# Patient Record
Sex: FEMALE | Race: WHITE | NOT HISPANIC OR LATINO | Employment: FULL TIME | URBAN - NONMETROPOLITAN AREA
[De-identification: names, ages, dates, MRNs, and addresses within clinical notes are randomized per-mention and may not be internally consistent; named-entity substitution may affect disease eponyms.]

---

## 2017-06-21 ENCOUNTER — TRANSCRIBE ORDERS (OUTPATIENT)
Dept: LAB | Facility: HOSPITAL | Age: 22
End: 2017-06-21

## 2017-06-21 ENCOUNTER — TRANSCRIBE ORDERS (OUTPATIENT)
Dept: GENERAL RADIOLOGY | Facility: CLINIC | Age: 22
End: 2017-06-21

## 2017-06-21 ENCOUNTER — LAB (OUTPATIENT)
Dept: LAB | Facility: HOSPITAL | Age: 22
End: 2017-06-21

## 2017-06-21 DIAGNOSIS — M54.5 LOW BACK PAIN, UNSPECIFIED BACK PAIN LATERALITY, UNSPECIFIED CHRONICITY, WITH SCIATICA PRESENCE UNSPECIFIED: Primary | ICD-10-CM

## 2017-06-21 DIAGNOSIS — M54.5 LOW BACK PAIN, UNSPECIFIED BACK PAIN LATERALITY, UNSPECIFIED CHRONICITY, WITH SCIATICA PRESENCE UNSPECIFIED: ICD-10-CM

## 2017-06-21 DIAGNOSIS — M54.50 SPINE PAIN, LUMBAR: Primary | ICD-10-CM

## 2017-06-21 LAB — B-HCG UR QL: NEGATIVE

## 2017-06-21 PROCEDURE — 81025 URINE PREGNANCY TEST: CPT

## 2017-06-28 ENCOUNTER — TRANSCRIBE ORDERS (OUTPATIENT)
Dept: PHYSICAL THERAPY | Facility: HOSPITAL | Age: 22
End: 2017-06-28

## 2017-06-28 DIAGNOSIS — S39.012A LUMBAR STRAIN, INITIAL ENCOUNTER: Primary | ICD-10-CM

## 2017-06-30 ENCOUNTER — HOSPITAL ENCOUNTER (OUTPATIENT)
Dept: PHYSICAL THERAPY | Facility: HOSPITAL | Age: 22
Setting detail: THERAPIES SERIES
End: 2017-06-30

## 2018-10-04 ENCOUNTER — PROCEDURE VISIT (OUTPATIENT)
Dept: OBSTETRICS AND GYNECOLOGY | Facility: CLINIC | Age: 23
End: 2018-10-04

## 2018-10-04 VITALS
HEIGHT: 67 IN | WEIGHT: 288 LBS | SYSTOLIC BLOOD PRESSURE: 128 MMHG | BODY MASS INDEX: 45.2 KG/M2 | DIASTOLIC BLOOD PRESSURE: 75 MMHG | HEART RATE: 79 BPM

## 2018-10-04 DIAGNOSIS — Z78.9 RELIES ON PARTNER'S VASECTOMY FOR PRIMARY METHOD OF CONTRACEPTION: ICD-10-CM

## 2018-10-04 DIAGNOSIS — Z30.46 NEXPLANON REMOVAL: Primary | ICD-10-CM

## 2018-10-04 PROCEDURE — 11982 REMOVE DRUG IMPLANT DEVICE: CPT | Performed by: NURSE PRACTITIONER

## 2018-10-04 NOTE — PROGRESS NOTES
Nexplanon Removal    Date of procedure:  10/4/2018    Risks and benefits discussed? yes  All questions answered? yes  Consents given by the patient  Written consent obtained? yes    Local anesthesia used:  yes - 3 cc's of  Meds; anesthesia local: 1% lidocaine with epinephrine    Procedure documentation:    The upper left arm (non-dominant) was marked at the intended site of removal.  The skin was cleansed with an antispetic solution.  Local anesthesia was injected.  A vertical horizontal was created at the distal tip of the implant.  The implant was removed intact without difficulty.  A 4x4 sterile gauze was placed over the incision site and the arm was wrapped with gauze.  She tolerated the procedure well.  There were no complications.  EBL was minimal.    Post procedure instructions: Remove the wrapping in 24 hours and cover with a  band-aid if still open.    Follow up needed: RHONDA Webber was seen today for procedure.    Diagnoses and all orders for this visit:    Nexplanon removal    Relies on partner's vasectomy for primary method of contraception        This note was electronically signed.    WESLEY Hernandez  October 4, 2018

## 2019-02-20 ENCOUNTER — HOSPITAL ENCOUNTER (OUTPATIENT)
Dept: ULTRASOUND IMAGING | Facility: HOSPITAL | Age: 24
Discharge: HOME | End: 2019-02-20
Admitting: NURSE PRACTITIONER

## 2019-02-20 DIAGNOSIS — R74.01 ELEVATED ALT MEASUREMENT: ICD-10-CM

## 2019-02-20 PROCEDURE — 76705 ECHO EXAM OF ABDOMEN: CPT

## 2019-04-02 DIAGNOSIS — R55 SYNCOPE AND COLLAPSE: Primary | ICD-10-CM

## 2019-04-03 ENCOUNTER — OFFICE VISIT (OUTPATIENT)
Dept: CARDIOLOGY | Facility: CLINIC | Age: 24
End: 2019-04-03

## 2019-04-03 VITALS
HEART RATE: 83 BPM | SYSTOLIC BLOOD PRESSURE: 128 MMHG | BODY MASS INDEX: 45.99 KG/M2 | WEIGHT: 293 LBS | HEIGHT: 67 IN | DIASTOLIC BLOOD PRESSURE: 70 MMHG

## 2019-04-03 DIAGNOSIS — R55 SYNCOPE AND COLLAPSE: Primary | ICD-10-CM

## 2019-04-03 PROCEDURE — 99204 OFFICE O/P NEW MOD 45 MIN: CPT | Performed by: INTERNAL MEDICINE

## 2019-04-03 PROCEDURE — 93000 ELECTROCARDIOGRAM COMPLETE: CPT | Performed by: INTERNAL MEDICINE

## 2019-04-03 RX ORDER — BUPROPION HYDROCHLORIDE 150 MG/1
150 TABLET ORAL DAILY
Refills: 0 | COMMUNITY
Start: 2019-03-28 | End: 2022-08-18

## 2019-04-03 NOTE — PROGRESS NOTES
Lawanda Lara  23 y.o. female    04/03/2019  1. Syncope and collapse        History of Present Illness:    Patient's Body mass index is 46.05 kg/m². BMI is above normal parameters. Recommendations include: exercise counseling, nutrition counseling and referral to primary care.    23 years old patient with long-standing history of syncope triggered by emotion, and needlestick, single blood evaluated by electrophysiologist on November and started on midodrine without significant improvement with abnormal head up tilt test several years ago continue having off-and-on passing out spell due to trigger mentioned above.  No chest pain orthopnea PND reported.  No sustained palpitation reported.  No intermittent claudication reported.  No chest pain reported.  Her grandmother was present in the room at the time of evaluations.  EKG sinus rhythm with a normal interval and evidence of preexcitation    SUBJECTIVE:    Allergies   Allergen Reactions   • Amoxicillin    • Penicillins Rash         Past Medical History:   Diagnosis Date   • ADHD (attention deficit hyperactivity disorder)    • Allergic    • Migraines    • Vasovagal syncope          Past Surgical History:   Procedure Laterality Date   • COLONOSCOPY           Family History   Problem Relation Age of Onset   • Depression Mother    • Diabetes Maternal Grandmother    • Hypertension Maternal Grandfather          Social History     Socioeconomic History   • Marital status: Single     Spouse name: Not on file   • Number of children: Not on file   • Years of education: Not on file   • Highest education level: Not on file   Tobacco Use   • Smoking status: Never Smoker   • Smokeless tobacco: Never Used   Substance and Sexual Activity   • Alcohol use: Yes     Comment: occasional   • Drug use: No         Current Outpatient Medications   Medication Sig Dispense Refill   • buPROPion XL (WELLBUTRIN XL) 150 MG 24 hr tablet Take 150 mg by mouth Daily.  0     No current  "facility-administered medications for this visit.            Review of Systems:     Constitutional:  Denies recent weight loss, weight gain, fever or chills, no change in exercise tolerance.     HENT:  Denies any hearing loss, epistaxis, hoarseness, or difficulty speaking.     Eyes: No blurry    Respiratory:  Denies dyspnea with exertion,no cough, wheezing, or hemoptysis.     Cardiovascular: See H&P    Gastrointestinal:  Denies change in bowel habits, dyspepsia, ulcer disease, hematochezia, or melena.     Endocrine: Negative for cold intolerance, heat intolerance, polydipsia, polyphagia and polyuria. Denies any history of weight change, polydipsia, polyuria.     Genitourinary: Negative.      Musculoskeletal: Denies any history of arthritic symptoms or back problems.     Skin:  Denies any change in hair or nails, rashes, or skin lesions.     Allergic/Immunologic: Negative.  Negative for environmental allergies, food allergies and immunocompromised state.     Neurological:  Denies any history of recurrent headaches, strokes, TIA, or seizure disorder. Positive for syncope    Hematological: Denies any food allergies, seasonal allergies, bleeding disorders, or lymphadenopathy.     Psychiatric/Behavioral: Denies any history of depression, substance abuse, or change in cognitive function.       OBJECTIVE:    /70   Pulse 83   Ht 170.2 cm (67\")   Wt 133 kg (294 lb)   BMI 46.05 kg/m²       Physical Exam:     Constitutional: Cooperative, alert and oriented, well-developed, well-nourished, in no acute distress.     HENT:   Head: Normocephalic, normal hair patterns, no masses or tenderness.  Ears, Nose, and Throat: No gross abnormalities. No pallor or cyanosis. Dentition good.   Eyes: EOMS intact, PERRL, conjunctivae and lids unremarkable. Fundoscopic exam and visual fields not performed.   Neck: No palpable masses or adenopathy, no thyromegaly, no JVD, carotid pulses are full and equal bilaterally and without  Bruits. "     Cardiovascular: Regular rhythm, S1 and S2 normal, no S3 or S4. Apical impulse not displaced. No murmurs, gallops, or rubs detected.     Pulmonary/Chest: Chest: normal symmetry, no tenderness to palpation, normal respiratory excursion, no intercostal retraction, no use of accessory muscles. Pulmonary: Normal breath sounds. No rales or rhonchi.    Abdominal: Abdomen soft, bowel sounds normoactive, no masses, no hepatosplenomegaly, non-tender, no bruits.     Musculoskeletal: No deformities, clubbing, cyanosis, erythema, or edema observed. There are no spinal abnormalities noted. Normal muscle strength and tone. Pulses full and equal in all extremities, no bruits auscultated.     Neurological: No gross motor or sensory deficits noted, affect appropriate, oriented to time, person, place.     Skin: Warm and dry to the touch, no apparent skin lesions or masses noted.     Psychiatric: She has a normal mood and affect. Her behavior is normal. Judgment and thought content normal.         Procedures      Lab Results   Component Value Date    WBC 7.8 08/25/2014    HGB 13.2 08/25/2014    HCT 37.9 08/25/2014    MCV 82.0 08/25/2014     08/25/2014     Lab Results   Component Value Date    GLUCOSE 106 (H) 08/25/2014    BUN 9 08/25/2014    CREATININE 0.7 08/25/2014    CO2 25 08/25/2014    CALCIUM 9.3 08/25/2014    ALBUMIN 4.4 08/25/2014    AST 31 08/25/2014    ALT 35 08/25/2014     No results found for: CHOL  No results found for: TRIG  No results found for: HDL  No components found for: LDLCALC  No results found for: LDL  No results found for: HDLLDLRATIO  No components found for: CHOLHDL  No results found for: HGBA1C  No results found for: TSH, N0BFDRN, U6OVUQD, THYROIDAB        ASSESSMENT AND PLAN:  #1 syncope #2 exogenous obesity    Clinically, no sign of cardiac decompensation based on clinical history physical findings.  Risk factor lifestyle modification discussed with the patient.  Significant of low carbohydrate,  low-fat explained to the patient's.  The patient have documented history of syncope and no known trigger and evaluated by elective neurologist at [facility] with the prescriptions of midodrine without significant benefit.  She was told to increase salt and water intake.  I spent more than 40-minute regarding behavioral modification and demonstrated the patient to stand against the wall and increase her time up to 40-minute.  Important salt and water intake discussed with the patient.  I would repeat head up tilt test and based on that we will consider either droxidopa or combination of droxidopa and midodrine.  We will see her back in a month    Lawanda was seen today for new patient.    Diagnoses and all orders for this visit:    Syncope and collapse  -     Tilt Table; Future  -     Adult Transthoracic Echo Complete W/ Cont if Necessary Per Protocol; Future        Amaury Vital MD  4/3/2019  9:07 AM

## 2019-04-17 ENCOUNTER — HOSPITAL ENCOUNTER (OUTPATIENT)
Dept: CARDIOLOGY | Facility: HOSPITAL | Age: 24
Discharge: HOME | End: 2019-04-17
Admitting: INTERNAL MEDICINE

## 2019-04-17 DIAGNOSIS — R55 SYNCOPE AND COLLAPSE: ICD-10-CM

## 2019-04-17 PROCEDURE — 93660 TILT TABLE EVALUATION: CPT

## 2019-04-17 PROCEDURE — 93660 TILT TABLE EVALUATION: CPT | Performed by: INTERNAL MEDICINE

## 2019-04-19 LAB
MAXIMAL PREDICTED HEART RATE: 197 BPM
STRESS TARGET HR: 167 BPM

## 2019-04-24 ENCOUNTER — OFFICE VISIT (OUTPATIENT)
Dept: CARDIOLOGY | Facility: CLINIC | Age: 24
End: 2019-04-24

## 2019-04-24 VITALS
HEART RATE: 96 BPM | WEIGHT: 289 LBS | DIASTOLIC BLOOD PRESSURE: 74 MMHG | OXYGEN SATURATION: 99 % | HEIGHT: 67 IN | BODY MASS INDEX: 45.36 KG/M2 | SYSTOLIC BLOOD PRESSURE: 126 MMHG

## 2019-04-24 DIAGNOSIS — R55 SYNCOPE AND COLLAPSE: Primary | ICD-10-CM

## 2019-04-24 PROCEDURE — 99213 OFFICE O/P EST LOW 20 MIN: CPT | Performed by: INTERNAL MEDICINE

## 2019-04-24 RX ORDER — MIDODRINE HYDROCHLORIDE 5 MG/1
5 TABLET ORAL 3 TIMES DAILY
Qty: 90 TABLET | Refills: 5 | Status: SHIPPED | OUTPATIENT
Start: 2019-04-24 | End: 2020-03-11 | Stop reason: HOSPADM

## 2019-04-24 NOTE — PROGRESS NOTES
Lawanda Lara  23 y.o. female    04/24/2019  1. Syncope and collapse        History of Present Illness:    Patient's Body mass index is 45.26 kg/m². BMI is above normal parameters. Recommendations include: exercise counseling, nutrition counseling and referral to primary care   .  23 years old patient with long-standing history of syncope triggered by emotion, and needlestick,  evaluated by electrophysiologist at [place] and started on midodrine without significant improvement with abnormal head up tilt test several years ago continue having off-and-on passing out spell due to trigger mentioned above.  No chest pain orthopnea PND reported.  No sustained palpitation reported.  No intermittent claudication reported.  No chest pain reported.  Her grandmother was present in the room at the time of evaluations.  EKG sinus rhythm with a normal interval and evidence of preexcitation patient had had up tilt test with a vasodepressive component and echo no significant valvular abnormality within normal heart function.  Results of head up tilt test and echocardiogram study discussed with the patient the family.  Patient was educated regarding risk factor lifestyle modification and increasing salt and water uptake        Procedure date 4/17/2019     Procedure performed:     Head up tilt test     Description:     Head up tilt test performed as per protocol and throughout the procedures patient heart rate blood pressure and saturation monitored.  The baseline heart rate 98 and with a blood pressure 120/86 at minute 24 ; 37-second the patient  drop in systolic blood pressure with the symptom of near syncope and table brought to the horizontal with recovery and without sequela with heart rate increased to 110 bpm.  The head up tilt test is positive with a vasodepressive mechanism of syncope     Clinical impression  Head up tilt test was positive for vasodepressive mechanism of syncope    ECHO 4/17/19  · Mild tricuspid  valve regurgitation is present.  · Estimated EF = 60%.  · Left ventricular systolic function is normal.  The mitral valve is normal in structure. Trace-to-mild mitral valve regurgitation is present.   Tricuspid Valve The tricuspid valve is normal. Mild tricuspid valve regurgitation is present.       SUBJECTIVE:    Allergies   Allergen Reactions   • Amoxicillin    • Penicillins Rash         Past Medical History:   Diagnosis Date   • ADHD (attention deficit hyperactivity disorder)    • Allergic    • Migraines    • Vasovagal syncope          Past Surgical History:   Procedure Laterality Date   • COLONOSCOPY           Family History   Problem Relation Age of Onset   • Depression Mother    • Diabetes Maternal Grandmother    • Hypertension Maternal Grandfather          Social History     Socioeconomic History   • Marital status: Single     Spouse name: Not on file   • Number of children: Not on file   • Years of education: Not on file   • Highest education level: Not on file   Tobacco Use   • Smoking status: Never Smoker   • Smokeless tobacco: Never Used   Substance and Sexual Activity   • Alcohol use: Yes     Comment: occasional   • Drug use: No         Current Outpatient Medications   Medication Sig Dispense Refill   • buPROPion XL (WELLBUTRIN XL) 150 MG 24 hr tablet Take 150 mg by mouth Daily.  0     No current facility-administered medications for this visit.            Review of Systems:     Constitutional:  Denies recent weight loss, weight gain, fever or chills, no change in exercise tolerance.     HENT:  Denies any hearing loss, epistaxis, hoarseness, or difficulty speaking.     Eyes: No blurry    Respiratory:  Denies dyspnea with exertion,no cough, wheezing, or hemoptysis.     Cardiovascular: Negative for palpations, chest pain, orthopnea, PND, peripheral edema, syncope, or claudication.     Gastrointestinal:  Denies change in bowel habits, dyspepsia, ulcer disease, hematochezia, or melena.     Endocrine:  "Negative for cold intolerance, heat intolerance, polydipsia, polyphagia and polyuria. Denies any history of weight change, polydipsia, polyuria.     Genitourinary: Negative.      Musculoskeletal: Denies any history of arthritic symptoms or back problems.     Skin:  Denies any change in hair or nails, rashes, or skin lesions.     Allergic/Immunologic: Negative.  Negative for environmental allergies, food allergies and immunocompromised state.     Neurological:  Denies any history of recurrent headaches, strokes, TIA, or seizure disorder.     Hematological: Denies any food allergies, seasonal allergies, bleeding disorders, or lymphadenopathy.     Psychiatric/Behavioral: Denies any history of depression, substance abuse, or change in cognitive function.       OBJECTIVE:    /74   Pulse 96   Ht 170.2 cm (67\")   Wt 131 kg (289 lb)   SpO2 99%   BMI 45.26 kg/m²       Physical Exam:     Constitutional: Cooperative, alert and oriented, well-developed, well-nourished, in no acute distress.     HENT:   Head: Normocephalic, normal hair patterns, no masses or tenderness.  Ears, Nose, and Throat: No gross abnormalities. No pallor or cyanosis. Dentition good.   Eyes: EOMS intact, PERRL, conjunctivae and lids unremarkable. Fundoscopic exam and visual fields not performed.   Neck: No palpable masses or adenopathy, no thyromegaly, no JVD, carotid pulses are full and equal bilaterally and without  Bruits.     Cardiovascular: Regular rhythm, S1 and S2 normal, no S3 or S4. Apical impulse not displaced. No murmurs, gallops, or rubs detected.     Pulmonary/Chest: Chest: normal symmetry, no tenderness to palpation, normal respiratory excursion, no intercostal retraction, no use of accessory muscles. Pulmonary: Normal breath sounds. No rales or rhonchi.    Abdominal: Abdomen soft, bowel sounds normoactive, no masses, no hepatosplenomegaly, non-tender, no bruits.     Musculoskeletal: No deformities, clubbing, cyanosis, erythema, or " edema observed. There are no spinal abnormalities noted. Normal muscle strength and tone. Pulses full and equal in all extremities, no bruits auscultated.     Neurological: No gross motor or sensory deficits noted, affect appropriate, oriented to time, person, place.     Skin: Warm and dry to the touch, no apparent skin lesions or masses noted.     Psychiatric: She has a normal mood and affect. Her behavior is normal. Judgment and thought content normal.         Procedures      Lab Results   Component Value Date    WBC 7.8 08/25/2014    HGB 13.2 08/25/2014    HCT 37.9 08/25/2014    MCV 82.0 08/25/2014     08/25/2014     Lab Results   Component Value Date    GLUCOSE 106 (H) 08/25/2014    BUN 9 08/25/2014    CREATININE 0.7 08/25/2014    CO2 25 08/25/2014    CALCIUM 9.3 08/25/2014    ALBUMIN 4.4 08/25/2014    AST 31 08/25/2014    ALT 35 08/25/2014     No results found for: CHOL  No results found for: TRIG  No results found for: HDL  No components found for: LDLCALC  No results found for: LDL  No results found for: HDLLDLRATIO  No components found for: CHOLHDL  No results found for: HGBA1C  No results found for: TSH, [id number], A1FUQLK, THYROIDAB        ASSESSMENT AND PLAN:  #1 syncope #2 exogenous obesity     Clinically, no sign of cardiac decompensation based on clinical history physical findings.  Risk factor lifestyle modification discussed with the patient.  Significant of low carbohydrate, low-fat explained to the patient's.  The patient have documented history of syncope and no known trigger and evaluated by elective neurologist at [facility] with the prescriptions of midodrine without significant benefit.  She was told to increase salt and water intake.  I spent more than 40-minute regarding behavioral modification and demonstrated the patient to stand against the wall and increase her time up to 40-minute.  Important salt and water intake discussed with the patient.  The patient has vasodepressive component  of vasovagal syncope and will start on midodrine starting dose of 5 mg p.o. 3 times daily dose can be increased and if he remains symptomatic droxidopa can be contemplated.      Lawanda was seen today for follow-up.    Diagnoses and all orders for this visit:    Syncope and collapse        Amaury Vital MD  4/24/2019  4:16 PM

## 2019-11-05 ENCOUNTER — TRANSCRIBE ORDERS (OUTPATIENT)
Dept: CT IMAGING | Facility: HOSPITAL | Age: 24
End: 2019-11-05

## 2019-11-05 DIAGNOSIS — R10.9 ABDOMINAL DISCOMFORT: Primary | ICD-10-CM

## 2020-03-11 ENCOUNTER — PROCEDURE VISIT (OUTPATIENT)
Dept: OBSTETRICS AND GYNECOLOGY | Facility: CLINIC | Age: 25
End: 2020-03-11

## 2020-03-11 VITALS
SYSTOLIC BLOOD PRESSURE: 118 MMHG | DIASTOLIC BLOOD PRESSURE: 64 MMHG | BODY MASS INDEX: 45.99 KG/M2 | HEIGHT: 67 IN | WEIGHT: 293 LBS

## 2020-03-11 DIAGNOSIS — N91.1 SECONDARY AMENORRHEA: ICD-10-CM

## 2020-03-11 DIAGNOSIS — Z12.4 ENCOUNTER FOR PAPANICOLAOU SMEAR OF CERVIX: Primary | ICD-10-CM

## 2020-03-11 DIAGNOSIS — Z11.3 SCREEN FOR STD (SEXUALLY TRANSMITTED DISEASE): ICD-10-CM

## 2020-03-11 DIAGNOSIS — Z01.419 ENCOUNTER FOR ANNUAL ROUTINE GYNECOLOGICAL EXAMINATION: ICD-10-CM

## 2020-03-11 DIAGNOSIS — R10.2 PELVIC PAIN: ICD-10-CM

## 2020-03-11 PROCEDURE — G0123 SCREEN CERV/VAG THIN LAYER: HCPCS | Performed by: NURSE PRACTITIONER

## 2020-03-11 PROCEDURE — 99395 PREV VISIT EST AGE 18-39: CPT | Performed by: NURSE PRACTITIONER

## 2020-03-11 PROCEDURE — 87661 TRICHOMONAS VAGINALIS AMPLIF: CPT | Performed by: NURSE PRACTITIONER

## 2020-03-11 PROCEDURE — 87491 CHLMYD TRACH DNA AMP PROBE: CPT | Performed by: NURSE PRACTITIONER

## 2020-03-11 PROCEDURE — 88141 CYTOPATH C/V INTERPRET: CPT | Performed by: PATHOLOGY

## 2020-03-11 PROCEDURE — 87591 N.GONORRHOEAE DNA AMP PROB: CPT | Performed by: NURSE PRACTITIONER

## 2020-03-11 RX ORDER — ERGOCALCIFEROL (VITAMIN D2) 50 MCG
CAPSULE ORAL
COMMUNITY
Start: 2020-02-26 | End: 2022-08-18

## 2020-03-11 NOTE — PROGRESS NOTES
Subjective   Lawanda Perez is a 24 y.o. here for gyn annual    LMP: unknown  BC: Vasectomy  PAP: 3 years ago, normal per patient      Pt c/o of not having period since October 2019. She had the Nexplanon for 3 years and had it removed in October 2018. Prior to the Nexplanon, she was on Depo-Provera for years and did not have a period with it. She reports that she has had two periods since her Nexplanon was removed. She also c/o of left sided pelvic pain X 1-2 months.     Gynecologic Exam   The patient's primary symptoms include missed menses and pelvic pain. The patient's pertinent negatives include no genital itching, genital lesions, genital odor, genital rash, vaginal bleeding or vaginal discharge. This is a chronic problem. The current episode started more than 1 month ago. The problem has been unchanged. The pain is mild. The problem affects the left side. Pertinent negatives include no abdominal pain, chills, constipation, diarrhea, dysuria, fever, frequency, nausea, rash or sore throat. Nothing aggravates the symptoms. She has tried NSAIDs for the symptoms. The treatment provided no relief. No, her partner does not have an STD. She uses vasectomy for contraception. Her menstrual history has been irregular.       The following portions of the patient's history were reviewed and updated as appropriate: allergies, current medications, past family history, past medical history, past social history, past surgical history and problem list.    Review of Systems   Constitutional: Negative for chills, fatigue, fever, unexpected weight gain and unexpected weight loss.   HENT: Negative for sneezing and sore throat.    Respiratory: Negative for shortness of breath.    Cardiovascular: Negative for chest pain and palpitations.   Gastrointestinal: Negative for abdominal pain, constipation, diarrhea and nausea.   Endocrine: Negative for cold intolerance and heat intolerance.   Genitourinary: Positive for amenorrhea,  menstrual problem, missed menses and pelvic pain. Negative for breast discharge, breast lump, breast pain, difficulty urinating, dysuria, frequency, pelvic pressure, urinary incontinence, vaginal bleeding, vaginal discharge and vaginal pain.   Skin: Negative for rash.   Neurological: Negative for weakness and headache.   Psychiatric/Behavioral: Negative for sleep disturbance, depressed mood and stress.       Objective   Physical Exam   Constitutional: She is oriented to person, place, and time. She appears well-developed and well-nourished.   Obese female   HENT:   Head: Normocephalic.   Neck: Normal range of motion. Neck supple.   Cardiovascular: Normal rate and regular rhythm.   Pulmonary/Chest: Effort normal and breath sounds normal.   Abdominal: Soft.   Genitourinary: There is no rash, tenderness, lesion or injury on the right labia. There is no rash, tenderness, lesion or injury on the left labia. Cervix exhibits friability. Cervix exhibits no discharge. No erythema, tenderness or bleeding in the vagina. No foreign body in the vagina. No signs of injury around the vagina. No vaginal discharge found.   Genitourinary Comments: Pap & GC swab obtained. Unable to perform bimanual as pt did not tolerate Pap test.    Musculoskeletal: Normal range of motion.   Neurological: She is alert and oriented to person, place, and time.   Skin: Skin is warm and dry.   Psychiatric: She has a normal mood and affect. Her behavior is normal.   Nursing note and vitals reviewed.        Assessment/Plan   Diagnoses and all orders for this visit:    Encounter for Papanicolaou smear of cervix  -     Liquid-based Pap Smear, Screening    Pelvic pain  -     US Non-ob Transvaginal; Future    Secondary amenorrhea  -     DHEA-Sulfate; Future  -     Follicle Stimulating Hormone; Future  -     hCG, Serum, Qualitative; Future  -     Hemoglobin A1c; Future  -     Luteinizing Hormone; Future  -     Prolactin; Future  -     Sex Horm Binding Globulin;  Future  -     Testosterone (Free & Total), LC / MS; Future  -     TSH; Future  -     US Non-ob Transvaginal; Future    Encounter for annual routine gynecological examination    Screen for STD (sexually transmitted disease)  -     Chlamydia trachomatis, Neisseria gonorrhoeae, Trichomonas vaginalis, PCR - Swab, Cervix          Discussed possible etiology of secondary amenorrhea to include PCOS. Pt to complete labs and TVUS and follow-up in 2-3 weeks.

## 2020-03-12 LAB
C TRACH RRNA CVX QL NAA+PROBE: NEGATIVE
N GONORRHOEA RRNA SPEC QL NAA+PROBE: NEGATIVE
TRICHOMONAS VAGINALIS PCR: NEGATIVE

## 2020-03-13 LAB
GEN CATEG CVX/VAG CYTO-IMP: NORMAL
LAB AP CASE REPORT: NORMAL
LAB AP GYN ADDITIONAL INFORMATION: NORMAL
LAB AP GYN OTHER FINDINGS: NORMAL
PATH INTERP SPEC-IMP: NORMAL
STAT OF ADQ CVX/VAG CYTO-IMP: NORMAL

## 2020-03-16 ENCOUNTER — LAB (OUTPATIENT)
Dept: LAB | Facility: HOSPITAL | Age: 25
End: 2020-03-16

## 2020-03-16 DIAGNOSIS — N91.1 SECONDARY AMENORRHEA: ICD-10-CM

## 2020-03-16 LAB
FSH SERPL-ACNC: 5.5 MIU/ML
HBA1C MFR BLD: 5.88 % (ref 4.8–5.6)
HCG SERPL QL: NEGATIVE
LH SERPL-ACNC: 7.82 MIU/ML
PROLACTIN SERPL-MCNC: 17.6 NG/ML (ref 4.79–23.3)
TSH SERPL DL<=0.05 MIU/L-ACNC: 2.87 UIU/ML (ref 0.27–4.2)

## 2020-03-16 PROCEDURE — 83001 ASSAY OF GONADOTROPIN (FSH): CPT

## 2020-03-16 PROCEDURE — 84703 CHORIONIC GONADOTROPIN ASSAY: CPT

## 2020-03-16 PROCEDURE — 84402 ASSAY OF FREE TESTOSTERONE: CPT

## 2020-03-16 PROCEDURE — 82627 DEHYDROEPIANDROSTERONE: CPT

## 2020-03-16 PROCEDURE — 84270 ASSAY OF SEX HORMONE GLOBUL: CPT

## 2020-03-16 PROCEDURE — 84146 ASSAY OF PROLACTIN: CPT

## 2020-03-16 PROCEDURE — 83036 HEMOGLOBIN GLYCOSYLATED A1C: CPT

## 2020-03-16 PROCEDURE — 83002 ASSAY OF GONADOTROPIN (LH): CPT

## 2020-03-16 PROCEDURE — 84403 ASSAY OF TOTAL TESTOSTERONE: CPT

## 2020-03-16 PROCEDURE — 84443 ASSAY THYROID STIM HORMONE: CPT

## 2020-03-17 LAB
DHEA-S SERPL-MCNC: 203.6 UG/DL (ref 110–431.7)
SHBG SERPL-SCNC: 16.2 NMOL/L (ref 24.6–122)

## 2020-03-18 LAB
TESTOST FREE SERPL-MCNC: 5.2 PG/ML (ref 0–4.2)
TESTOST SERPL-MCNC: 39.1 NG/DL (ref 10–55)

## 2020-03-19 ENCOUNTER — TELEPHONE (OUTPATIENT)
Dept: OBSTETRICS AND GYNECOLOGY | Facility: CLINIC | Age: 25
End: 2020-03-19

## 2020-03-19 RX ORDER — NORETHINDRONE ACETATE AND ETHINYL ESTRADIOL 1MG-20(21)
1 KIT ORAL DAILY
Qty: 28 TABLET | Refills: 12 | Status: SHIPPED | OUTPATIENT
Start: 2020-03-19 | End: 2021-03-19

## 2020-03-19 NOTE — TELEPHONE ENCOUNTER
Reviewed labs and TVUS- pt has PCOS based on irregular periods and elevated testosterone and low SHBG. Pt also has insulin resistance. Counseled on weight loss as a first line for management and pt agreeable to trying birth control to regulate menstrual cycles. Pt to follow-up in 3 months.

## 2022-08-18 PROBLEM — E66.01 MORBID OBESITY: Status: ACTIVE | Noted: 2021-05-25

## 2022-08-18 PROBLEM — E28.2 PCOS (POLYCYSTIC OVARIAN SYNDROME): Status: ACTIVE | Noted: 2021-05-25

## 2022-08-18 PROBLEM — E55.9 VITAMIN D DEFICIENCY: Status: ACTIVE | Noted: 2021-05-25

## 2022-08-18 PROBLEM — F41.8 MIXED ANXIETY DEPRESSIVE DISORDER: Status: ACTIVE | Noted: 2021-05-25

## 2022-08-18 PROBLEM — R35.0 FREQUENT URINATION: Status: ACTIVE | Noted: 2021-05-25

## 2022-08-18 PROBLEM — F33.1 MODERATE RECURRENT MAJOR DEPRESSION: Status: ACTIVE | Noted: 2021-05-25

## 2022-08-18 PROBLEM — N32.89 BLADDER SPASMS: Status: ACTIVE | Noted: 2021-05-25

## 2023-08-03 ENCOUNTER — OFFICE VISIT (OUTPATIENT)
Dept: OBSTETRICS AND GYNECOLOGY | Facility: CLINIC | Age: 28
End: 2023-08-03
Payer: COMMERCIAL

## 2023-08-03 VITALS
HEIGHT: 65 IN | SYSTOLIC BLOOD PRESSURE: 140 MMHG | BODY MASS INDEX: 48.82 KG/M2 | WEIGHT: 293 LBS | DIASTOLIC BLOOD PRESSURE: 86 MMHG

## 2023-08-03 DIAGNOSIS — Z01.419 WOMEN'S ANNUAL ROUTINE GYNECOLOGICAL EXAMINATION: Primary | ICD-10-CM

## 2023-08-03 DIAGNOSIS — Z12.4 ENCOUNTER FOR PAPANICOLAOU SMEAR FOR CERVICAL CANCER SCREENING: ICD-10-CM

## 2023-08-03 DIAGNOSIS — E28.2 PCOS (POLYCYSTIC OVARIAN SYNDROME): ICD-10-CM

## 2023-08-03 DIAGNOSIS — Z78.9 RELIES ON PARTNER'S VASECTOMY FOR PRIMARY METHOD OF CONTRACEPTION: ICD-10-CM

## 2023-08-03 DIAGNOSIS — R10.2 PELVIC PAIN: ICD-10-CM

## 2023-08-08 LAB — REF LAB TEST METHOD: NORMAL

## 2023-08-25 ENCOUNTER — HOSPITAL ENCOUNTER (EMERGENCY)
Facility: HOSPITAL | Age: 28
Discharge: HOME | End: 2023-08-26
Attending: EMERGENCY MEDICINE
Payer: COMMERCIAL

## 2023-08-25 ENCOUNTER — APPOINTMENT (OUTPATIENT)
Dept: ULTRASOUND IMAGING | Facility: HOSPITAL | Age: 28
End: 2023-08-25
Payer: COMMERCIAL

## 2023-08-25 DIAGNOSIS — N73.0 PID (ACUTE PELVIC INFLAMMATORY DISEASE): Primary | ICD-10-CM

## 2023-08-25 LAB
ALBUMIN SERPL-MCNC: 4.3 G/DL (ref 3.5–5.2)
ALBUMIN/GLOB SERPL: 1.1 G/DL
ALP SERPL-CCNC: 54 U/L (ref 39–117)
ALT SERPL W P-5'-P-CCNC: 114 U/L (ref 1–33)
ANION GAP SERPL CALCULATED.3IONS-SCNC: 14 MMOL/L (ref 5–15)
AST SERPL-CCNC: 69 U/L (ref 1–32)
BACTERIA UR QL AUTO: ABNORMAL /HPF
BASOPHILS # BLD AUTO: 0.09 10*3/MM3 (ref 0–0.2)
BASOPHILS NFR BLD AUTO: 0.5 % (ref 0–1.5)
BILIRUB SERPL-MCNC: 0.4 MG/DL (ref 0–1.2)
BILIRUB UR QL STRIP: NEGATIVE
BUN SERPL-MCNC: 8 MG/DL (ref 6–20)
BUN/CREAT SERPL: 13.6 (ref 7–25)
CALCIUM SPEC-SCNC: 9.5 MG/DL (ref 8.6–10.5)
CHLORIDE SERPL-SCNC: 102 MMOL/L (ref 98–107)
CLARITY UR: ABNORMAL
CO2 SERPL-SCNC: 20 MMOL/L (ref 22–29)
COLOR UR: YELLOW
CREAT SERPL-MCNC: 0.59 MG/DL (ref 0.57–1)
DEPRECATED RDW RBC AUTO: 37.1 FL (ref 37–54)
EGFRCR SERPLBLD CKD-EPI 2021: 126.1 ML/MIN/1.73
EOSINOPHIL # BLD AUTO: 0.11 10*3/MM3 (ref 0–0.4)
EOSINOPHIL NFR BLD AUTO: 0.6 % (ref 0.3–6.2)
ERYTHROCYTE [DISTWIDTH] IN BLOOD BY AUTOMATED COUNT: 12.3 % (ref 12.3–15.4)
GLOBULIN UR ELPH-MCNC: 4 GM/DL
GLUCOSE SERPL-MCNC: 97 MG/DL (ref 65–99)
GLUCOSE UR STRIP-MCNC: NEGATIVE MG/DL
HCG SERPL QL: NEGATIVE
HCT VFR BLD AUTO: 41.3 % (ref 34–46.6)
HGB BLD-MCNC: 14.2 G/DL (ref 12–15.9)
HGB UR QL STRIP.AUTO: NEGATIVE
HOLD SPECIMEN: NORMAL
HOLD SPECIMEN: NORMAL
HYALINE CASTS UR QL AUTO: ABNORMAL /LPF
IMM GRANULOCYTES # BLD AUTO: 0.1 10*3/MM3 (ref 0–0.05)
IMM GRANULOCYTES NFR BLD AUTO: 0.5 % (ref 0–0.5)
KETONES UR QL STRIP: NEGATIVE
LEUKOCYTE ESTERASE UR QL STRIP.AUTO: ABNORMAL
LIPASE SERPL-CCNC: 21 U/L (ref 13–60)
LYMPHOCYTES # BLD AUTO: 3.81 10*3/MM3 (ref 0.7–3.1)
LYMPHOCYTES NFR BLD AUTO: 20.1 % (ref 19.6–45.3)
MCH RBC QN AUTO: 28.3 PG (ref 26.6–33)
MCHC RBC AUTO-ENTMCNC: 34.4 G/DL (ref 31.5–35.7)
MCV RBC AUTO: 82.4 FL (ref 79–97)
MONOCYTES # BLD AUTO: 1.21 10*3/MM3 (ref 0.1–0.9)
MONOCYTES NFR BLD AUTO: 6.4 % (ref 5–12)
NEUTROPHILS NFR BLD AUTO: 13.6 10*3/MM3 (ref 1.7–7)
NEUTROPHILS NFR BLD AUTO: 71.9 % (ref 42.7–76)
NITRITE UR QL STRIP: NEGATIVE
NRBC BLD AUTO-RTO: 0 /100 WBC (ref 0–0.2)
PH UR STRIP.AUTO: 7 [PH] (ref 5–9)
PLATELET # BLD AUTO: 283 10*3/MM3 (ref 140–450)
PMV BLD AUTO: 9.9 FL (ref 6–12)
POTASSIUM SERPL-SCNC: 3.5 MMOL/L (ref 3.5–5.2)
PROT SERPL-MCNC: 8.3 G/DL (ref 6–8.5)
PROT UR QL STRIP: NEGATIVE
RBC # BLD AUTO: 5.01 10*6/MM3 (ref 3.77–5.28)
RBC # UR STRIP: ABNORMAL /HPF
REF LAB TEST METHOD: ABNORMAL
SODIUM SERPL-SCNC: 136 MMOL/L (ref 136–145)
SP GR UR STRIP: 1.02 (ref 1–1.03)
SQUAMOUS #/AREA URNS HPF: ABNORMAL /HPF
UROBILINOGEN UR QL STRIP: ABNORMAL
WBC # UR STRIP: ABNORMAL /HPF
WBC NRBC COR # BLD: 18.92 10*3/MM3 (ref 3.4–10.8)
WHOLE BLOOD HOLD COAG: NORMAL
WHOLE BLOOD HOLD SPECIMEN: NORMAL

## 2023-08-25 PROCEDURE — 25010000002 KETOROLAC TROMETHAMINE PER 15 MG: Performed by: EMERGENCY MEDICINE

## 2023-08-25 PROCEDURE — 96375 TX/PRO/DX INJ NEW DRUG ADDON: CPT

## 2023-08-25 PROCEDURE — 84703 CHORIONIC GONADOTROPIN ASSAY: CPT | Performed by: EMERGENCY MEDICINE

## 2023-08-25 PROCEDURE — 99284 EMERGENCY DEPT VISIT MOD MDM: CPT

## 2023-08-25 PROCEDURE — 93976 VASCULAR STUDY: CPT

## 2023-08-25 PROCEDURE — 83690 ASSAY OF LIPASE: CPT | Performed by: EMERGENCY MEDICINE

## 2023-08-25 PROCEDURE — 76856 US EXAM PELVIC COMPLETE: CPT

## 2023-08-25 PROCEDURE — 96361 HYDRATE IV INFUSION ADD-ON: CPT

## 2023-08-25 PROCEDURE — 87491 CHLMYD TRACH DNA AMP PROBE: CPT | Performed by: EMERGENCY MEDICINE

## 2023-08-25 PROCEDURE — 81001 URINALYSIS AUTO W/SCOPE: CPT | Performed by: EMERGENCY MEDICINE

## 2023-08-25 PROCEDURE — 87591 N.GONORRHOEAE DNA AMP PROB: CPT | Performed by: EMERGENCY MEDICINE

## 2023-08-25 PROCEDURE — 80053 COMPREHEN METABOLIC PANEL: CPT | Performed by: EMERGENCY MEDICINE

## 2023-08-25 PROCEDURE — 87661 TRICHOMONAS VAGINALIS AMPLIF: CPT | Performed by: EMERGENCY MEDICINE

## 2023-08-25 PROCEDURE — 85025 COMPLETE CBC W/AUTO DIFF WBC: CPT | Performed by: EMERGENCY MEDICINE

## 2023-08-25 RX ORDER — KETOROLAC TROMETHAMINE 30 MG/ML
30 INJECTION, SOLUTION INTRAMUSCULAR; INTRAVENOUS ONCE
Status: COMPLETED | OUTPATIENT
Start: 2023-08-25 | End: 2023-08-25

## 2023-08-25 RX ORDER — SODIUM CHLORIDE 9 MG/ML
125 INJECTION, SOLUTION INTRAVENOUS CONTINUOUS
Status: DISCONTINUED | OUTPATIENT
Start: 2023-08-25 | End: 2023-08-26 | Stop reason: HOSPADM

## 2023-08-25 RX ADMIN — KETOROLAC TROMETHAMINE 30 MG: 30 INJECTION, SOLUTION INTRAMUSCULAR; INTRAVENOUS at 22:54

## 2023-08-25 RX ADMIN — SODIUM CHLORIDE 125 ML/HR: 9 INJECTION, SOLUTION INTRAVENOUS at 22:54

## 2023-08-25 RX ADMIN — SODIUM CHLORIDE 125 ML/HR: 9 INJECTION, SOLUTION INTRAVENOUS at 22:55

## 2023-08-26 ENCOUNTER — APPOINTMENT (OUTPATIENT)
Dept: CT IMAGING | Facility: HOSPITAL | Age: 28
End: 2023-08-26
Payer: COMMERCIAL

## 2023-08-26 VITALS
OXYGEN SATURATION: 97 % | HEART RATE: 100 BPM | DIASTOLIC BLOOD PRESSURE: 78 MMHG | SYSTOLIC BLOOD PRESSURE: 138 MMHG | WEIGHT: 293 LBS | RESPIRATION RATE: 20 BRPM | HEIGHT: 66 IN | TEMPERATURE: 98.2 F | BODY MASS INDEX: 47.09 KG/M2

## 2023-08-26 LAB
C TRACH RRNA CVX QL NAA+PROBE: NEGATIVE
GLUCOSE BLDC GLUCOMTR-MCNC: 107 MG/DL (ref 70–130)
HOLD SPECIMEN: NORMAL
N GONORRHOEA RRNA SPEC QL NAA+PROBE: NEGATIVE
TRICHOMONAS VAGINALIS PCR: NEGATIVE

## 2023-08-26 PROCEDURE — 82948 REAGENT STRIP/BLOOD GLUCOSE: CPT

## 2023-08-26 PROCEDURE — 96365 THER/PROPH/DIAG IV INF INIT: CPT

## 2023-08-26 PROCEDURE — 74176 CT ABD & PELVIS W/O CONTRAST: CPT

## 2023-08-26 PROCEDURE — 96361 HYDRATE IV INFUSION ADD-ON: CPT

## 2023-08-26 PROCEDURE — 25010000002 CEFTRIAXONE PER 250 MG: Performed by: EMERGENCY MEDICINE

## 2023-08-26 RX ORDER — METRONIDAZOLE 500 MG/1
500 TABLET ORAL ONCE
Status: COMPLETED | OUTPATIENT
Start: 2023-08-26 | End: 2023-08-26

## 2023-08-26 RX ORDER — METRONIDAZOLE 500 MG/1
500 TABLET ORAL 2 TIMES DAILY
Qty: 28 TABLET | Refills: 0 | Status: SHIPPED | OUTPATIENT
Start: 2023-08-26 | End: 2023-09-09

## 2023-08-26 RX ORDER — NAPROXEN 500 MG/1
500 TABLET ORAL 2 TIMES DAILY PRN
Qty: 14 TABLET | Refills: 0 | Status: SHIPPED | OUTPATIENT
Start: 2023-08-26

## 2023-08-26 RX ORDER — DOXYCYCLINE 100 MG/1
100 CAPSULE ORAL ONCE
Status: COMPLETED | OUTPATIENT
Start: 2023-08-26 | End: 2023-08-26

## 2023-08-26 RX ORDER — DOXYCYCLINE 100 MG/1
100 CAPSULE ORAL 2 TIMES DAILY
Qty: 28 CAPSULE | Refills: 0 | Status: SHIPPED | OUTPATIENT
Start: 2023-08-26 | End: 2023-09-09

## 2023-08-26 RX ADMIN — DOXYCYCLINE 100 MG: 100 CAPSULE ORAL at 02:13

## 2023-08-26 RX ADMIN — METRONIDAZOLE 500 MG: 500 TABLET ORAL at 02:13
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
## 2023-08-26 NOTE — DISCHARGE INSTRUCTIONS
Return ED fever, pelvic pain, bleeding, vomiting, worse condition, any other concerns  Followup GYN 48hrs for repeat exam and FINAL lab results

## 2023-08-28 ENCOUNTER — TELEPHONE (OUTPATIENT)
Dept: OBSTETRICS AND GYNECOLOGY | Facility: CLINIC | Age: 28
End: 2023-08-28
Payer: COMMERCIAL

## 2023-08-28 NOTE — TELEPHONE ENCOUNTER
----- Message from Gerri Underwood MA sent at 8/28/2023  9:09 AM CDT -----  Can you look over her results? I will give her a call to discuss  ----- Message -----  From: Radha Canseco  Sent: 8/28/2023   8:44 AM CDT  To: Gerri Underwood MA    PATIENT IS WANTING TO GO OVER HER TEST RESULTS FROM THE ER

## 2023-08-28 NOTE — TELEPHONE ENCOUNTER
Returned patient call. Discussed results. Patient stated she had an area on her lower left side that felt like it was swelled and causing pain, was wondering if that could be related to a cyst. Advised patient that Dr Gross had looked through her results from her ct scan and ultrasounds and it was normal, showing no evidence of anything gynecological related-normal uterus and ovaries. Advised patient to continue taking her medication that was prescribed for possible UTI. Offered her to schedule an appointment to come in and talk with Dr Gross to follow up- at her last visit patient was offered OCPs/ Iud/nexplanon to help with her pelvic pain.   Patient states they are still working on trying to get her migraines figured out and she will let us know if she decides to proceed with any of the options Dr Gross had discussed with her.  Had no other questions or concerns.

## 2023-08-28 NOTE — TELEPHONE ENCOUNTER
CT scan and TVUS normal  Negative vag panel, GC/CT negative, UA shows possible UTI    Marj Gross MD  8/28/2023  10:13 CDT